# Patient Record
Sex: MALE | Race: WHITE | NOT HISPANIC OR LATINO | Employment: FULL TIME | URBAN - NONMETROPOLITAN AREA
[De-identification: names, ages, dates, MRNs, and addresses within clinical notes are randomized per-mention and may not be internally consistent; named-entity substitution may affect disease eponyms.]

---

## 2022-09-06 ENCOUNTER — TRANSCRIBE ORDERS (OUTPATIENT)
Dept: ADMINISTRATIVE | Facility: HOSPITAL | Age: 47
End: 2022-09-06

## 2022-09-06 DIAGNOSIS — R93.89 ABNORMAL FINDINGS ON IMAGING TEST: Primary | ICD-10-CM

## 2022-09-13 ENCOUNTER — HOSPITAL ENCOUNTER (OUTPATIENT)
Dept: CT IMAGING | Facility: HOSPITAL | Age: 47
Discharge: HOME | End: 2022-09-13
Admitting: INTERNAL MEDICINE

## 2022-09-13 DIAGNOSIS — R93.89 ABNORMAL FINDINGS ON IMAGING TEST: ICD-10-CM

## 2022-09-13 PROCEDURE — 25010000002 IOPAMIDOL 61 % SOLUTION: Performed by: INTERNAL MEDICINE

## 2022-09-13 PROCEDURE — 71260 CT THORAX DX C+: CPT

## 2022-09-13 RX ADMIN — IOPAMIDOL 100 ML: 612 INJECTION, SOLUTION INTRAVENOUS at 16:33

## 2022-09-20 ENCOUNTER — OFFICE VISIT (OUTPATIENT)
Dept: PULMONOLOGY | Facility: CLINIC | Age: 47
End: 2022-09-20

## 2022-09-20 VITALS
DIASTOLIC BLOOD PRESSURE: 98 MMHG | WEIGHT: 189 LBS | HEIGHT: 74 IN | BODY MASS INDEX: 24.26 KG/M2 | OXYGEN SATURATION: 98 % | HEART RATE: 60 BPM | SYSTOLIC BLOOD PRESSURE: 162 MMHG

## 2022-09-20 DIAGNOSIS — R91.1 NODULE OF UPPER LOBE OF LEFT LUNG: Primary | ICD-10-CM

## 2022-09-20 DIAGNOSIS — J45.20 MILD INTERMITTENT ASTHMA WITHOUT COMPLICATION: ICD-10-CM

## 2022-09-20 PROCEDURE — 99204 OFFICE O/P NEW MOD 45 MIN: CPT | Performed by: INTERNAL MEDICINE

## 2022-09-20 RX ORDER — FLUTICASONE PROPIONATE 50 MCG
SPRAY, SUSPENSION (ML) NASAL
COMMUNITY
Start: 2015-09-12

## 2022-09-20 RX ORDER — ALBUTEROL SULFATE 90 UG/1
AEROSOL, METERED RESPIRATORY (INHALATION)
COMMUNITY
Start: 2022-08-23

## 2022-09-20 RX ORDER — FAMOTIDINE 20 MG/1
20 TABLET, FILM COATED ORAL
COMMUNITY
Start: 2022-09-01 | End: 2022-12-20

## 2022-09-20 NOTE — PROGRESS NOTES
"Background:  Pt w sheezing, nonsmoker, 2.1 cm and 9mm nodules SHAUN mild hilar adenopathy 9/13/22   Chief Complaint  Lung Nodule    Subjective    History of Present Illness       Luis Milan presents to [facility].  History of Present Illness  Dr. Wooten has asked me to see him.  Nonsmoking patient who had incidentally found nodule in left lung and another smaller indeterminate nodule in same lobe on ct earlier this month.   He does not smoke.  He has had asthma and uses an inhaler, but no other lung disease.  No history of tuberculosis.  He works in the [employer] and has had histoplasma exposure.  He had Covid exposure but never had Covid.     has a past medical history of Asthma, extrinsic (Childhood).   has a past surgical history that includes Vasectomy.  family history includes Asthma in his father; Cancer in his father; Diabetes in his mother; Hypertension in his mother.   reports that he has never smoked. He does not have any smokeless tobacco history on file. He reports previous alcohol use. He reports that he does not use drugs.  No Known Allergies  Current Outpatient Medications   Medication Instructions   • albuterol sulfate  (90 Base) MCG/ACT inhaler INHALE 2 PUFFS BY MOUTH EVERY 4 HOURS   • famotidine (PEPCID) 20 mg, Oral   • fluticasone (FLONASE) 50 MCG/ACT nasal spray No dose, route, or frequency recorded.      Objective     Vital Signs:   /98   Pulse 60   Ht 188 cm (74\")   Wt 85.7 kg (189 lb)   SpO2 98% Comment: RA  BMI 24.27 kg/m²   Physical Exam  Constitutional:       Appearance: Normal appearance. He is not ill-appearing or diaphoretic.   Eyes:      Extraocular Movements: Extraocular movements intact.   Pulmonary:      Effort: Pulmonary effort is normal. No respiratory distress.      Breath sounds: No wheezing, rhonchi or rales.   Skin:     Findings: No erythema or rash.   Neurological:      Mental Status: He is alert.        Result " Review  Data Reviewed:{ Labs  Result Review  Imaging  Med Tab  Media :23}     XR Chest 2 View    Result Date: 2022  Impression:                             Department of Radiology                         RADIOLOGICAL CONSULTATION REPORT ================================================================================   Pt Name: TODD MARTIN                          MPI: [id number]            : 1975   Pt. Room #: [facility]                        Visit #: [id number]       Pt Status: O   Exam: [id number]                          Completed: 2022 12:28 PM   Ordering MD: TIA JAMES                        MRN: [id number] EXAMINATION: CHEST 2 VIEWS CLINICAL HISTORY: Other specified symptoms and signs involving the circulatory and respiratory systems COMPARISON: None Available. FINDINGS: Cardiac silhouette is within limits.  Both lungs are well expanded.   Faint nodular density, left midlung.  No dense parenchymal consolidation.  No pleural effusion or pneumothorax. IMPRESSION: Faint nodular opacity in the left midlung.  Potentially artifactual.   Recommend CT of the chest to definitively characterize and exclude an underlying nodule.           Dictated By: JUAN BRIZUELA MD,           2022 05:09 PM    CT Chest With Contrast Diagnostic    Result Date: 2022  Impression: 1. Peripheral pleural-based nodule in the left upper lobe laterally measuring 2.1 cm is nonspecific. A neoplasm is considered. Inflammation and infection felt to be less likely. A Stewart hump is also considered but felt to be less likely as there is no CT evidence of pulmonary embolus on the current study. This is not a CTA study. 2. There is also a 9 mm slightly ill-defined left upper lobe pulmonary nodule worrisome for neoplasm. Infectious or inflammatory nodule also considered. 3. Mild left hilar lymphadenopathy. Small mediastinal lymph nodes. 4. Small probable cyst in the right hepatic lobe of the  liver. Probable mild fatty liver. 5. Gastric wall thickening likely due to underdistention. Gastritis and other etiologies are also considered.    This report was finalized on 09/13/2022 17:13 by Dr. Tu Valdes MD.      My interpretation of radiograph: irregular pleural based density suggesting scar.  Additional nodule in SHAUN, indeterminate              Assessment and Plan {CC Problem List  Visit Diagnosis  ROS  Review (Popup)  Health Maintenance  Quality  BestPractice  Medications  SmartSets  SnapShot Encounters  Media :23}   Diagnoses and all orders for this visit:    1. Nodule of upper lobe of left lung (Primary)  -     NM PET/CT Skull Base to Mid Thigh; Future    2. Mild intermittent asthma without complication    regarding the nodule, pt is a nonsmoker with no obvious risk factors for cancer.  I reviewed the ct images with him.  Will plan PET scan.  Discussed rationale.  If abnormal then resection vs navigational bronchoscopy to be considered.  If negative, then we could follow with serial scans. Regarding asthma, this appears controlled; recommend no changes in regimen at this time.  Follow Up {Instructions Charge Capture  Follow-up Communications :23}   Return in about 3 months (around 12/20/2022).  Patient was given instructions and counseling regarding his condition or for health maintenance advice. Please see specific information pulled into the AVS if appropriate.    Electronically signed by Mao Saucedo MD, 9/20/2022, 14:02 CDT

## 2022-09-28 ENCOUNTER — HOSPITAL ENCOUNTER (OUTPATIENT)
Dept: CT IMAGING | Facility: HOSPITAL | Age: 47
Discharge: HOME | End: 2022-09-28

## 2022-09-28 DIAGNOSIS — R91.1 NODULE OF UPPER LOBE OF LEFT LUNG: ICD-10-CM

## 2022-09-28 DIAGNOSIS — R91.1 NODULE OF UPPER LOBE OF LEFT LUNG: Primary | ICD-10-CM

## 2022-09-28 PROCEDURE — 78815 PET IMAGE W/CT SKULL-THIGH: CPT

## 2022-09-28 PROCEDURE — A9552 F18 FDG: HCPCS | Performed by: INTERNAL MEDICINE

## 2022-09-28 PROCEDURE — 0 FLUDEOXYGLUCOSE F18 SOLUTION: Performed by: INTERNAL MEDICINE

## 2022-09-28 RX ADMIN — FLUDEOXYGLUCOSE F18 1 DOSE: 300 INJECTION INTRAVENOUS at 08:38

## 2022-09-28 NOTE — PROGRESS NOTES
Please call the patient regarding his abnormal result.  This showed abnormal uptake in the nodule near the edge of the lung and a couple of lymph nodes on the left side.  This means something is going on in those areas; I am still concerned it could be either histoplasma or tumor.  Before committing to anything invasive, have him do a histo urinary antigen. If that is positive then I would feel better about holding off anything invasive and see if it resolves or calcifies.  Otherwise I think the next step would be to remove that one on the left just under the outer lung lining.  I will order the test; he will have to do that through the [facility] lab

## 2022-09-29 ENCOUNTER — LAB (OUTPATIENT)
Dept: LAB | Facility: HOSPITAL | Age: 47
End: 2022-09-29

## 2022-09-29 DIAGNOSIS — R91.1 NODULE OF UPPER LOBE OF LEFT LUNG: ICD-10-CM

## 2022-09-29 PROCEDURE — 87385 HISTOPLASMA CAPSUL AG IA: CPT

## 2022-10-04 LAB — H CAPSUL AG UR QL IA: <0.5

## 2022-10-05 NOTE — PROGRESS NOTES
Please call the patient regarding his abnormal result.  The urine test came back negative so that did not give us proof of histo infection.  I am still concerned that we should remove the spot surgically.  The only other potential option short of surgery is the nodify blood test for cancer antibiodies, but I'm not sure that that would alter the recommendation in the long run and it would drag the workup along longer.

## 2022-10-06 ENCOUNTER — TELEPHONE (OUTPATIENT)
Dept: PULMONOLOGY | Facility: CLINIC | Age: 47
End: 2022-10-06

## 2022-10-06 NOTE — TELEPHONE ENCOUNTER
----- Message from Mao Saucedo MD sent at 10/5/2022  5:22 PM CDT -----  Please call the patient regarding his abnormal result.  The urine test came back negative so that did not give us proof of histo infection.  I am still concerned that we should remove the spot surgically.  The only other potential option short of surgery is the nodify blood test for cancer antibiodies, but I'm not sure that that would alter the recommendation in the long run and it would drag the workup along longer.

## 2022-10-06 NOTE — TELEPHONE ENCOUNTER
Called him regarding the urine results.    He does want to do the surgery but he has several questions.      Please call him at [phone] and he said you can leave a message.

## 2022-10-07 DIAGNOSIS — R91.1 NODULE OF UPPER LOBE OF LEFT LUNG: Primary | ICD-10-CM

## 2022-10-19 ENCOUNTER — OFFICE VISIT (OUTPATIENT)
Dept: CARDIAC SURGERY | Facility: CLINIC | Age: 47
End: 2022-10-19

## 2022-10-19 VITALS
SYSTOLIC BLOOD PRESSURE: 134 MMHG | HEART RATE: 61 BPM | OXYGEN SATURATION: 99 % | BODY MASS INDEX: 25.05 KG/M2 | WEIGHT: 195.2 LBS | DIASTOLIC BLOOD PRESSURE: 94 MMHG | HEIGHT: 74 IN

## 2022-10-19 DIAGNOSIS — R91.1 LUNG NODULE: Primary | ICD-10-CM

## 2022-10-19 PROCEDURE — 99204 OFFICE O/P NEW MOD 45 MIN: CPT | Performed by: THORACIC SURGERY (CARDIOTHORACIC VASCULAR SURGERY)

## 2022-10-20 ENCOUNTER — TELEPHONE (OUTPATIENT)
Dept: CARDIAC SURGERY | Facility: CLINIC | Age: 47
End: 2022-10-20

## 2022-10-20 NOTE — TELEPHONE ENCOUNTER
Patient is scheduled for follow up on 12/21/22 - CT scan is typically scheduled same day prior to office visit. I attempted to call patient, no answer. LM with the above information explaining we would call closer to appt date with the CT information.

## 2022-10-20 NOTE — TELEPHONE ENCOUNTER
Caller: Luis Milan    Relationship: Self    Best call back number: [phone]    What is the best time to reach you: ANYTIME, VM OKAY TO LEAVE    Who are you requesting to speak with (clinical staff, provider,  specific staff member): CLINICAL    Do you know the name of the person who called: LUIS    What was the call regarding: PT WANTS TO KNOW WHAT DATE DR. MIX WOULD LIKE FOR HIM TO COMPLETE THE CT SCAN WITH CONTRAST.     Do you require a callback: YES

## 2022-10-27 ENCOUNTER — PATIENT ROUNDING (BHMG ONLY) (OUTPATIENT)
Dept: CARDIAC SURGERY | Facility: CLINIC | Age: 47
End: 2022-10-27

## 2022-10-27 NOTE — PROGRESS NOTES
A PEMRED message has been sent to the patient for PATIENT ROUNDING with [facility].

## 2022-11-14 NOTE — PROGRESS NOTES
Chief Complaint   Patient presents with   • Lung Nodule     New patient referred by Dr. Saucedo         Subjective     History of Present Illness    47-year-old male of Dr. Wooten and Dr. Saucedo who presents today for the finding of an incidental left lung nodule and a smaller indeterminate nodule in the same lobe.  He is a non-smoker but does have passive exposure to smoke.  He does have asthma and uses inhaler.  He denies tuberculosis.  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  He has had COVID exposure but never had COVID.  He denies unintended weight loss, hoarseness of voice, chest pain, hemoptysis, history of pneumonia, or cough.  He has had further testing and I have been asked to evaluate for consideration of wedge resection.      Review of Systems   Constitutional: Positive for fatigue. Negative for activity change, appetite change, chills, diaphoresis, fever and unexpected weight change.   HENT: Negative for dental problem, hearing loss, nosebleeds, sore throat, trouble swallowing and voice change.    Eyes: Negative for photophobia, redness and visual disturbance.   Respiratory: Negative for apnea, cough, chest tightness, shortness of breath, wheezing and stridor.    Cardiovascular: Negative for chest pain, palpitations and leg swelling.   Gastrointestinal: Negative for abdominal distention, abdominal pain, blood in stool, constipation, diarrhea, nausea and vomiting.   Endocrine: Negative for cold intolerance, heat intolerance, polyphagia and polyuria.   Genitourinary: Negative for decreased urine volume, difficulty urinating, dysuria, flank pain, frequency, hematuria and urgency.   Musculoskeletal: Negative for arthralgias, back pain, gait problem, joint swelling, myalgias and neck pain.   Skin: Negative for pallor, rash and wound.   Allergic/Immunologic: Negative for immunocompromised state.   Neurological: Negative for dizziness, tremors, seizures, syncope, speech difficulty,  "weakness, light-headedness, numbness and headaches.   Hematological: Does not bruise/bleed easily.   Psychiatric/Behavioral: Negative for confusion, sleep disturbance and suicidal ideas. The patient is not nervous/anxious.           Past Medical History:   Diagnosis Date   • Asthma, extrinsic Childhood     Past Surgical History:   Procedure Laterality Date   • VASECTOMY       Family History   Problem Relation Age of Onset   • Diabetes Mother    • Hypertension Mother    • Asthma Father    • Cancer Father      Social History     Tobacco Use   • Smoking status: Never     Passive exposure: Never   • Smokeless tobacco: Former     Types: Chew     Quit date: 2020   Substance Use Topics   • Alcohol use: Not Currently     Comment: Every once in a while. Nothing weekly   • Drug use: Never     Current Outpatient Medications   Medication Sig Dispense Refill   • albuterol sulfate  (90 Base) MCG/ACT inhaler INHALE 2 PUFFS BY MOUTH EVERY 4 HOURS     • famotidine (PEPCID) 20 MG tablet Take 20 mg by mouth.     • fluticasone (FLONASE) 50 MCG/ACT nasal spray        No current facility-administered medications for this visit.     Allergies:  Patient has no known allergies.    Objective      Vital Signs  Visit Vitals  /94 (BP Location: Right arm, Patient Position: Sitting, Cuff Size: Adult)   Pulse 61   Ht 188 cm (74\")   Wt 88.5 kg (195 lb 3.2 oz)   SpO2 99%   BMI 25.06 kg/m²         Physical Exam  Constitutional:       Appearance: He is well-developed.   HENT:      Head: Normocephalic and atraumatic.   Eyes:      Pupils: Pupils are equal, round, and reactive to light.   Neck:      Thyroid: No thyromegaly.      Vascular: No JVD.      Trachea: No tracheal deviation.   Cardiovascular:      Rate and Rhythm: Normal rate and regular rhythm.      Heart sounds: Normal heart sounds. No murmur heard.    No friction rub. No gallop.   Pulmonary:      Effort: Pulmonary effort is normal. No respiratory distress.      Breath sounds: " Normal breath sounds. No wheezing or rales.   Chest:      Chest wall: No tenderness.   Abdominal:      General: There is no distension.      Palpations: Abdomen is soft.      Tenderness: There is no abdominal tenderness.   Musculoskeletal:         General: Normal range of motion.      Cervical back: Normal range of motion and neck supple.   Lymphadenopathy:      Cervical: No cervical adenopathy.   Skin:     General: Skin is warm and dry.   Neurological:      Mental Status: He is alert and oriented to person, place, and time.      Cranial Nerves: No cranial nerve deficit.         Results Review:     Study Result    Narrative & Impression   EXAMINATION:  CT CHEST W CONTRAST DIAGNOSTIC-  9/13/2022 4:25 PM CDT     HISTORY: Pulmonary nodule. R93.89-Abnormal findings on diagnostic  imaging of other specified body structures.     COMPARISON : No comparison study.     DLP: 210 mGy-cm. Automated dosage reduction technique was utilized to  reduce patient dosage.     TECHNIQUE: CT was performed of the chest with IV contrast. Sagittal and  coronal images were reconstructed.     MEDIASTINUM, HEART AND VASCULAR STRUCTURES: The thoracic aorta and  pulmonary arteries are normal caliber. There is a 9 mm short axis  diameter lymph node to the left of the aortic arch. There are small AP  window lymph nodes. There is a 1 cm proximal left hilar short axis  diameter lymph node. There is another 1.1 cm short axis diameter  proximal left hilar lymph node. There are couple of smaller lymph nodes  also in the left hilum.     LUNGS: There is a 2.1 cm pleural-based area of nodularity in the left  upper lobe laterally image 78 series 3. There is a 9 mm slightly  ill-defined left upper lobe pulmonary nodule image 84 series 3.     UPPER ABDOMEN: There is a probable 7 mm cyst in the right lobe of the  liver image 166 series 2. There may be mild fatty liver. There are no  dense gallstones. There is thickening of the gastric wall. There  is  underdistention of the stomach. The pancreas, spleen, adrenal glands and  visualized portions of the kidneys demonstrate no abnormality.     BONES: There is pectus excavatum deformity of the sternum. No acute  appearing bony abnormality is seen.     IMPRESSION:  1. Peripheral pleural-based nodule in the left upper lobe laterally  measuring 2.1 cm is nonspecific. A neoplasm is considered. Inflammation  and infection felt to be less likely. A Stewart hump is also considered  but felt to be less likely as there is no CT evidence of pulmonary  embolus on the current study. This is not a CTA study.  2. There is also a 9 mm slightly ill-defined left upper lobe pulmonary  nodule worrisome for neoplasm. Infectious or inflammatory nodule also  considered.  3. Mild left hilar lymphadenopathy. Small mediastinal lymph nodes.  4. Small probable cyst in the right hepatic lobe of the liver. Probable  mild fatty liver.  5. Gastric wall thickening likely due to underdistention. Gastritis and  other etiologies are also considered.          This report was finalized on 09/13/2022 17:13 by Dr. Tu Valdes MD.     Study Result    Narrative & Impression   Exam: NM PET/CT SKULL BASE TO MID THIGH-     Indication: Evaluation of solitary pulmonary nodule     Comparison: Chest CT 9/13/2022     13.0 mCi F-18 FDG was administered IV per protocol via the LEFT  antecubital fossa. Blood glucose at the time of injection was 98 mg/dl.     PET/CT images were obtained from the base of the skull to the proximal  femurs approximately 60 minutes after radiotracer administration.  Noncontrast CT images of the neck, chest, abdomen, and pelvis were  obtained for attenuation correction and anatomic localization. DLP: 1063  mGy cm. Automated exposure control was also utilized to decrease patient  radiation dose.     Findings:  Background right hepatic lobe metabolic activity measures max SUV 2.8.     *  Redemonstrated 1.5 cm LEFT upper lobe  pleural/subpleural nodule with  hypermetabolic activity, maximum SUV 4.0.     *  8 mm LEFT upper lobe pulmonary nodule on image 126 has only low level  FDG uptake below physiologic background with a maximum SUV of 1.6.     *  Hypermetabolic LEFT hilar and LEFT mediastinal lymph nodes are  present. LEFT hilar lymph node on fused image 129 has a maximum SUV of  3.5. 2.0 x 0.9 cm LEFT mediastinal lymph node adjacent to the main  pulmonary artery on image 129 has a maximum SUV of 3.4. No RIGHT hilar  or RIGHT mediastinal hypermetabolic lymph nodes.     *  No abnormal hypermetabolic activity in the neck.     *  No abnormal hypermetabolic activity in the abdomen or pelvis.     Non-FDG avid findings:     Lower intracranial cavity appears unremarkable. No acute orbital  finding. Parapharyngeal fat planes are maintained. Parotid glands appear  unremarkable. No large thyroid nodule. Mastoid air cells are clear.  Trace mucosal thickening in the RIGHT maxillary sinus. Paranasal sinuses  are otherwise clear.     The central airways are clear. No consolidation or pleural effusion. No  enlarged axillary or supraclavicular lymph nodes. Thoracic aorta is  nonaneurysmal. No pericardial effusion. Pectus excavatum deformity. No  acute chest wall soft tissue abnormality.     Small low-density liver lesions too small to characterize but likely a  cyst. No gallstones or biliary ductal dilatation. Pancreas, spleen, and  adrenal glands are unremarkable. No urolithiasis or hydronephrosis. No  focal urinary bladder abnormality. Normal appendix. No abnormal bowel  distention or evidence of active bowel inflammation.     No ascites or free pelvic fluid. No pelvic mass or pelvic collection.  Prostate and seminal vesicles are unremarkable. Nonaneurysmal abdominal  aorta. No enlarged retroperitoneal, mesenteric, pelvic, or inguinal  lymph nodes. Small umbilical fat-containing hernia. No acute osseous  finding.     IMPRESSION:     1.  1.5 cm LEFT  upper lobe pleural/subpleural nodule with hypermetabolic  activity. This is concerning for a neoplastic process although  differential diagnosis also includes an infectious or inflammatory  process. Hypermetabolic LEFT hilar and LEFT mediastinal lymph nodes are  presumably related to this process. Recommend close clinical follow-up.     2.  8 mm LEFT upper lobe pulmonary nodule with low-level FDG uptake  below physiologic background. This may be related to an infectious or  inflammatory process but an indolent hypometabolic neoplasm is also  within the differential. Recommend continued imaging surveillance.     3.  No evidence of metastatic disease in the neck, abdomen, or pelvis.  This report was finalized on 09/28/2022 10:39 by Dr. Celestino Shore MD.            I reviewed the patient's new clinical results.  Discussed with patient and wife      Assessment & Plan       Diagnoses and all orders for this visit:    1. Lung nodule (Primary)  -     CT chest w contrast; Future          I discussed the patients findings and my recommendations with patient and wife.    We discussed the differential diagnoses possible given a lung nodule and a secondary nodule.  The appearance of the nodules to suggest more likely infectious rather true malignancy at this time on my review.  He is asymptomatic.  He is a lifelong non-smoker unfortunately I suspect to clear his disease, a lobectomy required.    We discussed options for care including continued surveillance verses efforts towards diagnosis and treatment.  We discussed options for diagnosis including bronchoscopy, CT-guided needle biopsy, or surgical biopsy with either cervical mediastinoscopy or Thoracoscopy with resection.  Surveillance is probably the best option at this time.  Based Fleischner criteria, it is recommended 3 months follow with CT scan of the chest.    Signs and symptoms suggesting malignancy including but not limited to hoarseness of voice, hemoptysis,  persistent cough, unintended weight loss, chest pain, or persistent pneumonia.  The potential of a delayed diagnosis and adverse results were discussed.  Collaboratively a decision has been made that reflects the patient's risk factors, the data available and patient's considerations.  All questions have been answered to the best of my ability and he is agreeable to the aforementioned plan.  I will recheck Dr. Saucedo as to the merits of tumor markers/malignancy risk analysis/cancer blood test.      Alternatively we can proceed with mediastinoscopy and if this is negative then proceed with resection with the hope towards a wedge resection but be prepared for a lobectomy.    All questions been answered to the best of my ability and is agreeable to the forementioned plan.    He is a non-smoker.    Many thanks for the opportunity to care for your patient.    I will continue to keep you apprised of provided care as it ensues.

## 2022-12-02 ENCOUNTER — CLINICAL SUPPORT (OUTPATIENT)
Dept: PULMONOLOGY | Facility: CLINIC | Age: 47
End: 2022-12-02

## 2022-12-02 DIAGNOSIS — R91.1 NODULE OF UPPER LOBE OF LEFT LUNG: Primary | ICD-10-CM

## 2022-12-02 NOTE — PROGRESS NOTES
Patient came in today to get the Nodify blood test done for Dr. Saucedo.   This is a test that is billed through his insurance by [employer].  We didn't use any of our blood draw supplies as they were provided by [employer].       Edwige Abad CMA 12/02/2022

## 2022-12-19 NOTE — PROGRESS NOTES
"Chief Complaint  Nodule of upper lobe of left lung    Subjective    History of Present Illness {CC  Problem List  Visit Diagnosis   Encounters  Notes  Medications  Labs  Result Review Imaging  Media     Luis Milan presents to [facility] for:    History of Present Illness  Mr. Milan is here for follow up and management of left lung nodule. He has repeat imaging scheduled for tomorrow with subsequent appointment with CT surgery. Nodify lab testing has been received back and is less than 1% likelihood of malignancy. He denies any pulmonary symptoms. He has had asthma his whole life and feels like it is well controlled with albuterol as needed. He has never smoked.        Prior to Admission medications    Medication Sig Start Date End Date Taking? Authorizing Provider   albuterol sulfate  (90 Base) MCG/ACT inhaler INHALE 2 PUFFS BY MOUTH EVERY 4 HOURS 8/23/22   ProviderJohnnie MD   famotidine (PEPCID) 20 MG tablet Take 20 mg by mouth. 9/1/22 9/2/23  Johnnie Beauchamp MD   fluticasone (FLONASE) 50 MCG/ACT nasal spray  9/12/15   ProviderJohnnie MD       Social History     Socioeconomic History   • Marital status:    Tobacco Use   • Smoking status: Never     Passive exposure: Never   • Smokeless tobacco: Former     Types: Chew     Quit date: 2020   Substance and Sexual Activity   • Alcohol use: Not Currently     Comment: Every once in a while. Nothing weekly   • Drug use: Never   • Sexual activity: Yes     Partners: Female     Birth control/protection: Vasectomy     Comment: Been  over 20 years 1 partner       Objective   Vital Signs:   /74   Pulse 80   Ht 188 cm (74\")   Wt 92.3 kg (203 lb 6.4 oz)   SpO2 98%   BMI 26.12 kg/m²     Physical Exam  Constitutional:       General: He is not in acute distress.     Interventions: Face mask in place.   HENT:      Head: Normocephalic.      Nose: Nose normal.      " Mouth/Throat:      Mouth: Mucous membranes are moist.   Eyes:      General: No scleral icterus.  Cardiovascular:      Rate and Rhythm: Normal rate.   Pulmonary:      Effort: No respiratory distress.   Abdominal:      General: There is no distension.   Neurological:      Mental Status: He is alert and oriented to person, place, and time.   Psychiatric:         Mood and Affect: Mood normal.         Behavior: Behavior is cooperative.        Result Review :{ Labs  Result Review  Imaging  Med Tab  Media :          No results found for this or any previous visit.                   Assessment and Plan {CC Problem List  Visit Diagnosis  ROS  Review (Popup)  Health Maintenance  Quality  BestPractice  Medications  SmartSets  SnapShot Encounters  Media      Diagnoses and all orders for this visit:    1. Nodule of upper lobe of left lung (Primary)    2. Mild intermittent asthma without complication        BMI is >= 25 and <30. (Overweight) The following options were offered after discussion;: weight loss educational material (shared in after visit summary)      Continue albuterol as needed. CT chest scheduled for tomorrow with office appt at [facility] to follow. We reviewed Nodify lab results with less than 1% likelihood of malignancy. Follow up in a few months with spirometry and dlco. Call in the interim with issues.     Olamide Patel, APRN  12/20/2022  16:05 CST    Follow Up {Instructions Charge Capture  Follow-up Communications   Return in about 3 months (around 3/20/2023) for spirometry and dlco .    Patient was given instructions and counseling regarding his condition or for health maintenance advice. Please see specific information pulled into the AVS if appropriate.

## 2022-12-20 ENCOUNTER — OFFICE VISIT (OUTPATIENT)
Dept: PULMONOLOGY | Facility: CLINIC | Age: 47
End: 2022-12-20

## 2022-12-20 VITALS
BODY MASS INDEX: 26.1 KG/M2 | HEIGHT: 74 IN | WEIGHT: 203.4 LBS | SYSTOLIC BLOOD PRESSURE: 132 MMHG | DIASTOLIC BLOOD PRESSURE: 74 MMHG | HEART RATE: 80 BPM | OXYGEN SATURATION: 98 %

## 2022-12-20 DIAGNOSIS — J45.20 MILD INTERMITTENT ASTHMA WITHOUT COMPLICATION: Chronic | ICD-10-CM

## 2022-12-20 DIAGNOSIS — R91.1 NODULE OF UPPER LOBE OF LEFT LUNG: Primary | ICD-10-CM

## 2022-12-20 PROCEDURE — 99213 OFFICE O/P EST LOW 20 MIN: CPT | Performed by: NURSE PRACTITIONER

## 2022-12-21 ENCOUNTER — HOSPITAL ENCOUNTER (OUTPATIENT)
Dept: CT IMAGING | Facility: HOSPITAL | Age: 47
Discharge: HOME | End: 2022-12-21
Admitting: THORACIC SURGERY (CARDIOTHORACIC VASCULAR SURGERY)

## 2022-12-21 ENCOUNTER — OFFICE VISIT (OUTPATIENT)
Dept: CARDIAC SURGERY | Facility: CLINIC | Age: 47
End: 2022-12-21
Payer: COMMERCIAL

## 2022-12-21 VITALS
DIASTOLIC BLOOD PRESSURE: 92 MMHG | HEIGHT: 74 IN | BODY MASS INDEX: 26.1 KG/M2 | WEIGHT: 203.4 LBS | OXYGEN SATURATION: 98 % | HEART RATE: 70 BPM | SYSTOLIC BLOOD PRESSURE: 148 MMHG

## 2022-12-21 DIAGNOSIS — R91.1 LUNG NODULE: Primary | ICD-10-CM

## 2022-12-21 DIAGNOSIS — R91.1 LUNG NODULE: ICD-10-CM

## 2022-12-21 LAB — CREAT BLDA-MCNC: 1 MG/DL (ref 0.6–1.3)

## 2022-12-21 PROCEDURE — 82565 ASSAY OF CREATININE: CPT

## 2022-12-21 PROCEDURE — 99213 OFFICE O/P EST LOW 20 MIN: CPT | Performed by: THORACIC SURGERY (CARDIOTHORACIC VASCULAR SURGERY)

## 2022-12-21 PROCEDURE — 25010000002 IOPAMIDOL 61 % SOLUTION: Performed by: THORACIC SURGERY (CARDIOTHORACIC VASCULAR SURGERY)

## 2022-12-21 PROCEDURE — 71260 CT THORAX DX C+: CPT

## 2022-12-21 RX ADMIN — IOPAMIDOL 84 ML: 612 INJECTION, SOLUTION INTRAVENOUS at 12:54

## 2023-01-09 PROBLEM — R91.1 LUNG NODULE: Status: ACTIVE | Noted: 2023-01-09

## 2023-01-09 NOTE — PROGRESS NOTES
Subjective   Patient ID: Luis Milan is a 47 y.o. male who is here for follow-up of a known lung nodules.   Chief Complaint   Patient presents with   • Lung Nodule     Patient is here for follow up w/CT      History of Present Illness    No new events. No new medical events. Worried about testing today.  His blood test for cancer was negative.     He denies unintended weight loss, hoarseness of voice, chest pain, hemoptysis, history of pneumonia, or cough.    The following portions of the patient's history were reviewed and updated as appropriate: allergies, current medications, past family history, past medical history, past social history, past surgical history and problem list.       Objective   Physical Exam  Constitutional:       Appearance: He is well-developed.   HENT:      Head: Normocephalic and atraumatic.   Eyes:      Pupils: Pupils are equal, round, and reactive to light.   Neck:      Thyroid: No thyromegaly.      Vascular: No JVD.      Trachea: No tracheal deviation.   Cardiovascular:      Rate and Rhythm: Normal rate and regular rhythm.      Heart sounds: Normal heart sounds. No murmur heard.    No friction rub. No gallop.   Pulmonary:      Effort: Pulmonary effort is normal. No respiratory distress.      Breath sounds: Normal breath sounds. No wheezing or rales.   Chest:      Chest wall: No tenderness.   Abdominal:      General: There is no distension.      Palpations: Abdomen is soft.      Tenderness: There is no abdominal tenderness.   Musculoskeletal:         General: Normal range of motion.      Cervical back: Normal range of motion and neck supple.   Lymphadenopathy:      Cervical: No cervical adenopathy.   Skin:     General: Skin is warm and dry.   Neurological:      Mental Status: He is alert and oriented to person, place, and time.      Cranial Nerves: No cranial nerve deficit.           CT Chest With Contrast Diagnostic    Result Date: 12/21/2022  Narrative: CT CHEST W CONTRAST DIAGNOSTIC-  12/21/2022 12:52 PM CST  HISTORY: lung nodule; R91.1-Solitary pulmonary nodule  COMPARISON: CT scan dated 09/13/2022  DOSE LENGTH PRODUCT: 277 mGy cm. Automated exposure control was also utilized to decrease patient radiation dose.  TECHNIQUE: Serial helical tomographic images of the chest were acquired following the intravenous infusion of contrast. Multiplanar reformatted images were provided for review.  FINDINGS:  Visualized neck base: The imaged portion of the base of the neck appears unremarkable.  Lungs: Previously identified left upper lobe juxtapleural nodule is much smaller in size, now only 1 cm x 0.5 cm in axial diameters (series 4-image 95), previously 2.0 x 1.8 cm. The second left upper lobe nodule is also smaller, previously 1.1 cm in diameter and now measuring 0.7 cm. There are no new or enlarging nodules. No consolidation. Lungs are well expanded. The airways are clear.  Heart: The heart is normal in size. There is no pericardial effusion.  Vasculature: Thoracic aorta is normal in caliber. No dissection identified. The pulmonary arteries are normal in appearance.  Mediastinum and lymph nodes: No suspicious hilar or mediastinal adenopathy..  Skeletal and soft tissues: Chest wall soft tissues are unremarkable. No acute bony abnormality.  Upper abdomen: The imaged portion of the upper abdomen demonstrates no acute process.       Impression: 1. Both of the left upper lobe pulmonary nodules are smaller in size. The larger 2.0 x 1.8 cm juxtapleural nodule now measures 1.0 x 0.5 cm. The second 1.1 cm nodule has decreased to 0.7 cm. Favor infectious/inflammatory process over lung neoplasia. 2. There are no new or enlarging nodules. 3. No suspicious adenopathy.   This report was finalized on 12/21/2022 13:59 by Dr Alok Sigala, .      Diagnoses and all orders for this visit:    1. Lung nodule (Primary)  -     CT chest w contrast; Future          Assessment & Plan   Independent Review of Radiographic  Studies:    The left upper lobe lung nodule that is dominant in size has had marked reduction measuring now in greatest dimension 1 cm compared to previously 2 cm.  The second lesion measuring 1.1 cm size  now measures 0.6 cm.  No new lung nodules.  No adenopathy.    Given reduction in size I favor an infectious/inflammatory process.  Recommend continued ongoing surveillance to ensure resolution.  He has been congratulated as to non-smoking status.  Discussed signs and symptoms which would suggest an earlier study.  All question answered the best my ability and he is agreeable to the forementioned plan.    Many thanks for the opportunity to care for your patient.    I will continue to keep you apprised of provided care as it ensues.

## 2023-12-20 ENCOUNTER — OFFICE VISIT (OUTPATIENT)
Dept: CARDIAC SURGERY | Facility: CLINIC | Age: 48
End: 2023-12-20
Payer: COMMERCIAL

## 2023-12-20 ENCOUNTER — HOSPITAL ENCOUNTER (OUTPATIENT)
Dept: CT IMAGING | Facility: HOSPITAL | Age: 48
Discharge: HOME | End: 2023-12-20
Payer: COMMERCIAL

## 2023-12-20 VITALS
BODY MASS INDEX: 25.89 KG/M2 | WEIGHT: 201.7 LBS | HEART RATE: 71 BPM | OXYGEN SATURATION: 99 % | HEIGHT: 74 IN | SYSTOLIC BLOOD PRESSURE: 144 MMHG | DIASTOLIC BLOOD PRESSURE: 92 MMHG

## 2023-12-20 DIAGNOSIS — R91.1 LUNG NODULE: ICD-10-CM

## 2023-12-20 DIAGNOSIS — R91.1 LUNG NODULE: Primary | ICD-10-CM

## 2023-12-20 PROCEDURE — 71250 CT THORAX DX C-: CPT

## 2023-12-20 NOTE — PROGRESS NOTES
Subjective   Patient ID: Luis Milan is a 48 y.o. male who is here for follow-up of a known lung nodules.   Chief Complaint   Patient presents with    Lung Nodule     Patient is here for follow up w/CT     History of Present Illness  12/20/2023  Luis Milan is a 48-year-old male who presents today for follow-up of left upper lobe lung nodules.    The patient reports he is doing well.  He denies smoking.  He denies any major industrial chemical exposures.  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  He reports working in [employer].  He states he is exposed to secondhand smoke.  He states he has had CT scans 7 years ago, 5 years ago, 2 years ago, and 1 year ago.    12/21/2022  No new events. No new medical events. Worried about testing today.  His blood test for cancer was negative.     He denies unintended weight loss, hoarseness of voice, chest pain, hemoptysis, history of pneumonia, or cough.    The following portions of the patient's history were reviewed and updated as appropriate: allergies, current medications, past family history, past medical history, past social history, past surgical history and problem list.       Objective   Physical Exam  Constitutional:       Appearance: He is well-developed.   HENT:      Head: Normocephalic and atraumatic.   Eyes:      Pupils: Pupils are equal, round, and reactive to light.   Neck:      Thyroid: No thyromegaly.      Vascular: No JVD.      Trachea: No tracheal deviation.   Cardiovascular:      Rate and Rhythm: Normal rate and regular rhythm.      Heart sounds: Normal heart sounds. No murmur heard.     No friction rub. No gallop.   Pulmonary:      Effort: Pulmonary effort is normal. No respiratory distress.      Breath sounds: Normal breath sounds. No wheezing or rales.   Chest:      Chest wall: No tenderness.   Abdominal:      General: There is no distension.      Palpations: Abdomen is soft.      Tenderness: There is no abdominal tenderness.   Musculoskeletal:          General: Normal range of motion.      Cervical back: Normal range of motion and neck supple.   Lymphadenopathy:      Cervical: No cervical adenopathy.   Skin:     General: Skin is warm and dry.   Neurological:      Mental Status: He is alert and oriented to person, place, and time.      Cranial Nerves: No cranial nerve deficit.           CT Chest Without Contrast Diagnostic    Result Date: 12/20/2023  Narrative: EXAM/TECHNIQUE: CT chest without contrast  INDICATION: lung nodule; R91.1-Solitary pulmonary nodule  COMPARISON: 6/21/2023  DLP: 391.25 mGy.cm. Automated exposure control was also utilized to decrease patient radiation dose.  FINDINGS:  The central airways are clear. No consolidation or pleural effusion. Mild biapical parenchymal scarring. Mild centrilobular emphysema.  No change in 7 mm subpleural left upper lobe pulmonary nodule, image 86. No change in 6 mm left upper lobe pulmonary nodule, image 90. No new or enlarging pulmonary nodule.  No enlarged thoracic lymph nodes. Main pulmonary artery is nondilated. Thoracic aorta is nonaneurysmal. No pericardial effusion.  No large thyroid nodule. No acute chest wall soft tissue abnormality. No acute finding in the included portion of the upper abdomen. No acute osseous finding.      Impression:  No change in 7 and 6 mm left upper lobe pulmonary nodules. Continued imaging surveillance is recommended.  This report was signed and finalized on 12/20/2023 8:25 AM by Dr. Ceelstino Shore MD.       Independent Review of Radiographic Studies:    The left upper lobe lung nodule that is dominant in size has had marked reduction measuring now in greatest dimension 1 cm compared to previously 2 cm.  The second lesion measuring 1.1 cm size  now measures 0.6 cm.  No new lung nodules.  No adenopathy.    12/20/2023: Left upper lobe lung nodules x2 are noncalcified and stable compared to last year's assessment.      Diagnoses and all orders for this visit:    1. Lung nodule  (Primary)  -     CT Chest Without Contrast; Future      Assessment & Plan     Impression:  1. Left upper lobe multiple lung nodules x2 and they are stable, non-smoker.    Medical decision making/Recommendations/Plan:  I believe should he make conservative at this point, continue ongoing active surveillance.  I will plan to see him back in 9 months with a repeat CT scan of the chest without contrast.  Discussed signs and symptoms which prompt an earlier assessment.  He is a non-smoker and congratulated as to that.  Thanks again for the opportunity to aid in the care of your patient.  I will continue to keep you apprised of care as it ensues.    Transcribed from ambient dictation for Oneal Summers MD by Ronny Palmer.  12/20/23   12:36 CST    Patient or patient representative verbalized consent to the visit recording.  I have personally performed the services described in this document as transcribed by the above individual, and it is both accurate and complete.

## 2024-09-25 ENCOUNTER — OFFICE VISIT (OUTPATIENT)
Dept: CARDIAC SURGERY | Facility: CLINIC | Age: 49
End: 2024-09-25
Payer: COMMERCIAL

## 2024-09-25 ENCOUNTER — HOSPITAL ENCOUNTER (OUTPATIENT)
Dept: CT IMAGING | Facility: HOSPITAL | Age: 49
Discharge: HOME | End: 2024-09-25
Admitting: THORACIC SURGERY (CARDIOTHORACIC VASCULAR SURGERY)
Payer: COMMERCIAL

## 2024-09-25 VITALS
HEART RATE: 75 BPM | SYSTOLIC BLOOD PRESSURE: 142 MMHG | OXYGEN SATURATION: 100 % | BODY MASS INDEX: 25.41 KG/M2 | WEIGHT: 198 LBS | DIASTOLIC BLOOD PRESSURE: 84 MMHG | HEIGHT: 74 IN

## 2024-09-25 DIAGNOSIS — R91.1 LUNG NODULE: ICD-10-CM

## 2024-09-25 DIAGNOSIS — R91.8 MULTIPLE LUNG NODULES: Primary | ICD-10-CM

## 2024-09-25 PROCEDURE — 99213 OFFICE O/P EST LOW 20 MIN: CPT | Performed by: THORACIC SURGERY (CARDIOTHORACIC VASCULAR SURGERY)

## 2024-09-25 PROCEDURE — 71250 CT THORAX DX C-: CPT

## 2024-10-04 ENCOUNTER — TELEPHONE (OUTPATIENT)
Dept: CARDIAC SURGERY | Facility: CLINIC | Age: 49
End: 2024-10-04

## 2024-10-04 NOTE — TELEPHONE ENCOUNTER
Caller: uLis Milan    Relationship: Self    Best call back number: [phone]    What orders are you requesting (i.e. lab or imaging): CT SCAN    In what timeframe would the patient need to come in: BEFORE JUNE 2025    Where will you receive your lab/imaging services: [facility]    Additional notes: PT IS NEEDING HIS ORDER SENT TO [facility] DUE TO THE OUT OF POCKET COST AT [facility] BEING $1200. PT STATES HE CAN GET IT DONE AT [facility] FOR $100. THANK YOU

## 2024-10-04 NOTE — TELEPHONE ENCOUNTER
Called and left message with [facility] Scheduling department requesting fax number. If they call back, please obtain fax number and document in this encounter

## 2024-10-13 NOTE — PROGRESS NOTES
"Chief Complaint   Patient presents with    Lung Nodule     Pt is here for follow-up w/ CT         Subjective     History of Present Illness  The patient presents for evaluation of lung nodules.    He has been living with asthma since childhood, which has led to breathing difficulties throughout the year. His condition is particularly exacerbated during spring and fall, necessitating the use of an inhaler. 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 Despite these challenges, he maintains a healthy lifestyle, including regular running and a balanced diet. He reports no instances of hemoptysis or unintentional weight loss.    SOCIAL HISTORY  He is a non-smoker.    Review of Systems           Current Outpatient Medications   Medication Sig Dispense Refill    albuterol sulfate  (90 Base) MCG/ACT inhaler INHALE 2 PUFFS BY MOUTH EVERY 4 HOURS      fluticasone (FLONASE) 50 MCG/ACT nasal spray        No current facility-administered medications for this visit.     Allergies:  Patient has no known allergies.    Objective      Vital Signs  Visit Vitals  /84 (BP Location: Right arm, Patient Position: Sitting, Cuff Size: Adult)   Pulse 75   Ht 188 cm (74\")   Wt 89.8 kg (198 lb)   SpO2 100%   BMI 25.42 kg/m²         Physical Exam  Physical Exam  Patient is in no acute distress, appropriate, alert.  Lungs are clear to auscultation bilaterally without wheezing, rubs, or rales.  Heart has a regular rate and rhythm without murmurs, rubs, or gallops.  Abdomen is soft, nondistended, nontender.  Extremities show no clubbing, cyanosis or edema.    Results Review:   CT Chest Without Contrast Diagnostic    Result Date: 9/25/2024  Narrative: EXAM/TECHNIQUE: CT chest without contrast  INDICATION: Lung nodule; R91.1-Solitary pulmonary nodule  COMPARISON: 12/20/2023  DLP: 455.88 mGy.cm. Automated exposure control was also " utilized to decrease patient radiation dose.  FINDINGS:  Central airways are clear. No consolidation or pleural effusion. No advanced emphysematous or fibrotic change. Mild bilateral central bronchiectasis.  No change in 7 mm subpleural left upper lobe pulmonary nodule on image 89. No change in 6 mm left upper lobe pulmonary nodule on image 92. No new or enlarging pulmonary nodule.  No enlarged thoracic lymph nodes. Main pulmonary artery is nondilated. Thoracic aorta is nonaneurysmal. No pericardial effusion.  No large thyroid nodule. No acute chest soft tissue abnormality. No acute finding in the visualized portion of the upper abdomen. No acute osseous finding. Mild pectus excavatum deformity.      Impression:  No change in 7 mm and 6 mm left upper lobe pulmonary nodules. These have decreased in size when compared to older exams dating back to 9/13/2022. Favor etiology to be an infectious or inflammatory process.  This report was signed and finalized on 9/25/2024 8:24 AM by Dr. Celestino Shroe MD.     Results  Imaging  CT scan of chest shows two left upper lobe lung nodules, one measuring 1 cm and the other 6 mm, with no change in size compared to previous study in December 2023.  I reviewed the patient's new clinical results.  Discussed with patient      Assessment & Plan       Diagnoses and all orders for this visit:    1. Multiple lung nodules (Primary)  -     CT Chest Without Contrast; Future      Assessment & Plan  1. Left upper lobe lung nodules.  CT scan of the chest obtained on 09/25/2024 shows two left upper lobe lung nodules. The previously described 1 cm lesion remains 1 cm, and the 6 mm lesion now measures 6 mm, indicating no change since the previous study in December 2023. Given the stability of these nodules over the past two years, ongoing surveillance is recommended. The patient is an excellent candidate for resection, but due to the stability and non-calcified nature of the nodules, continued  monitoring is preferred. A repeat imaging study will be planned in 9 months. If there is no change at that time, the interval may be extended to a year, and potentially longer if stability persists. The patient understands and agrees with this plan.      He is a non smoker.   Follow-up  Return in 9 months for follow-up.    Transcribed from ambient dictation for Oneal Summers MD by Oneal Summers MD.  10/13/24   15:11 CDT    Patient or patient representative verbalized consent for the use of Ambient Listening during the visit with  Oneal Summers MD for chart documentation. 10/13/2024  15:13 CDT

## 2025-03-20 ENCOUNTER — TELEPHONE (OUTPATIENT)
Dept: CARDIAC SURGERY | Facility: CLINIC | Age: 50
End: 2025-03-20

## 2025-03-20 NOTE — TELEPHONE ENCOUNTER
Caller: Luis Milan    Relationship: Self    Best call back number: [phone]     What orders are you requesting (i.e. lab or imaging): CT CHEST WO CONTRAST    In what timeframe would the patient need to come in: [facility] F/U IS SCHEDULED 6.18    Where will you receive your lab/imaging services: [facility]    Additional notes: PT HAS AN ORDER FOR HIS CT SCAN FOR HIS F/U IN JUNE. HE NEEDS THE ORDER SENT TO THE LOCATION ABOVE DUE TO INSURANCE COVERAGES BEING CHEAPER FOR HIM AT THIS IMAGING CENTER. PLEASE CONTACT THE PT WHEN THE ORDER HAS BEEN SENT OVER SO HE CAN CALL TO SCHEDULE HIS SCAN

## 2025-03-21 NOTE — TELEPHONE ENCOUNTER
Order faxed to [facility] scheduling at [phone]. Called pt and advised. He will contact them to schedule.

## 2025-03-24 ENCOUNTER — TELEPHONE (OUTPATIENT)
Dept: CARDIAC SURGERY | Facility: CLINIC | Age: 50
End: 2025-03-24

## 2025-03-24 NOTE — TELEPHONE ENCOUNTER
Pt calling re: a CT to be sched at [facility].  They are reporting to pt that this will need precert from our office.  Pt calling to find out what he needs to do to get this done.  Can reach him at #[phone]/sandy

## 2025-03-24 NOTE — TELEPHONE ENCOUNTER
Pt called back re: this.  Attempted to explain that pt is not due for testing yet and that our office is working on this but that if ins auth is obtained to early, will  before testing is due.  Pt insists on getting this now as he states he cannot sched testing until auth is in place and he needs to have testing done ahead of his appt with our office.  Pt adamant re: speaking with someone re: getting this done ASAP.  Call tx'd to clinical staff line/sandy

## 2025-04-01 NOTE — TELEPHONE ENCOUNTER
CT Chest approved per [facility]. Auth scanned into media. Faxed order and auth to [facility]uling - called pt and notified of this so he can schedule. I did advise the auth expires 5/16/25 so he would need to schedule prior to that date. He voiced understanding.

## 2025-05-29 PROBLEM — R91.1 LUNG NODULE: Chronic | Status: ACTIVE | Noted: 2023-01-09

## 2025-05-29 PROBLEM — Z78.9 NONSMOKER: Status: ACTIVE | Noted: 2025-05-29

## 2025-05-29 NOTE — PROGRESS NOTES
Sheryl Sommer, HAYDE  [facility]  [address]   [address]            [place] [address]  Phone: [phone]  Fax: [phone]          Chief Complaint  Lung Nodule (Patient is here for follow up w/CT)    Subjective     Luis Milan presents to [facility] CARDIOTHORACIC SURGERY for appointment.    History of Present Illness  The patient presents for evaluation of lung nodules and asthma.    He has been under the care of Dr. Summers, with a history of blood tests conducted at Dr. Saucedo's office. He reports no personal or familial history of lung cancer and has never engaged in smoking. 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 He maintains an active lifestyle, running approximately 1.5 to 2 miles daily. He is not experiencing any shortness of breath or abnormal coughing, attributing any respiratory symptoms to seasonal changes.    He has a known diagnosis of asthma. His asthma is currently well-controlled, although he experienced a minor flare-up yesterday due to exposure to pine shavings and chicken houses without his mask. He had a significant flare-up in early spring but reports stable condition at present. He did not use his inhaler at night this week. He uses albuterol.    SOCIAL HISTORY  Occupations: Works in the poultry industry  Exercise: Runs a couple of miles a day  Tobacco: Has never smoked cigarettes    FAMILY HISTORY  - Negative for lung cancer in family history.    Objective   Past Medical History:   Diagnosis Date    Asthma in adult without complication 5/30/2025    Asthma, extrinsic Childhood    Lung nodule 01/09/2023   ,   Past Surgical History:   Procedure Laterality Date    VASECTOMY     ,   Family History   Problem Relation Age of Onset    Diabetes Mother     Hypertension Mother     Heart attack Mother     Asthma Father     Cancer Father    ,  "  Social History     Tobacco Use    Smoking status: Never     Passive exposure: Never    Smokeless tobacco: Never   Substance Use Topics    Alcohol use: Not Currently     Comment: Every once in a while. Nothing weekly    Drug use: Never   , (Not in a hospital admission)  , Allergies: Patient has no known allergies.    Vital Signs:   /92   Pulse 62   Ht 188 cm (74\")   Wt 90 kg (198 lb 6.4 oz)   SpO2 100%   BMI 25.47 kg/m²        Physical Exam  Vitals reviewed.   Constitutional:       General: He is not in acute distress.     Appearance: He is well-developed.   HENT:      Head: Normocephalic and atraumatic.   Eyes:      General: No scleral icterus.     Conjunctiva/sclera: Conjunctivae normal.      Pupils: Pupils are equal, round, and reactive to light.   Cardiovascular:      Rate and Rhythm: Normal rate.   Pulmonary:      Effort: Pulmonary effort is normal. No respiratory distress.      Breath sounds: Normal breath sounds. No wheezing or rales.   Musculoskeletal:         General: Normal range of motion.      Cervical back: Normal range of motion and neck supple.   Skin:     General: Skin is warm and dry.   Neurological:      Mental Status: He is alert and oriented to person, place, and time.   Psychiatric:         Behavior: Behavior normal.         Thought Content: Thought content normal.         Judgment: Judgment normal.          Result Review :  The following data was reviewed by: HAYDE Daniel on 05/30/2025:    RADIOLOGY - SCAN - CT CHEST, [facility], 5-12-25 (05/19/2025)         No results found for this or any previous visit.             Assessment and Plan  Diagnoses and all orders for this visit:    1. Lung nodules (Primary)  Overview:  CT Chest With Contrast Diagnostic (09/13/2022 16:33)  IMPRESSION:  1. Peripheral pleural-based nodule in the left upper lobe laterally  measuring 2.1 cm is nonspecific. A neoplasm is considered. Inflammation  and infection felt to be less likely. A Stewart hump " is also considered  but felt to be less likely as there is no CT evidence of pulmonary  embolus on the current study. This is not a CTA study.  2. There is also a 9 mm slightly ill-defined left upper lobe pulmonary  nodule worrisome for neoplasm. Infectious or inflammatory nodule also  considered.  3. Mild left hilar lymphadenopathy. Small mediastinal lymph nodes.  4. Small probable cyst in the right hepatic lobe of the liver. Probable  mild fatty liver.  5. Gastric wall thickening likely due to underdistention. Gastritis and  other etiologies are also considered.      NM PET/CT Skull Base to Mid Thigh (09/28/2022 10:17)   IMPRESSION:  1.  1.5 cm LEFT upper lobe pleural/subpleural nodule with hypermetabolic  activity. This is concerning for a neoplastic process although  differential diagnosis also includes an infectious or inflammatory  process. Hypermetabolic LEFT hilar and LEFT mediastinal lymph nodes are  presumably related to this process. Recommend close clinical follow-up.     2.  8 mm LEFT upper lobe pulmonary nodule with low-level FDG uptake  below physiologic background. This may be related to an infectious or  inflammatory process but an indolent hypometabolic neoplasm is also  within the differential. Recommend continued imaging surveillance.     3.  No evidence of metastatic disease in the neck, abdomen, or pelvis.    CT Chest With Contrast Diagnostic (12/21/2022 12:53)  IMPRESSION:  1. Both of the left upper lobe pulmonary nodules are smaller in size.  The larger 2.0 x 1.8 cm juxtapleural nodule now measures 1.0 x 0.5 cm.  The second 1.1 cm nodule has decreased to 0.7 cm. Favor  infectious/inflammatory process over lung neoplasia.  2. There are no new or enlarging nodules.  3. No suspicious adenopathy.    CT Chest With Contrast Diagnostic (06/21/2023 08:33)  IMPRESSION:  Continued decrease in size of 8 mm and 6 mm LEFT upper lobe pulmonary  nodules. Favor a slowly resolving infectious or inflammatory  process.  Recommend continued imaging surveillance.    CT Chest Without Contrast Diagnostic (12/20/2023 08:12)  IMPRESSION:  No change in 7 and 6 mm left upper lobe pulmonary nodules. Continued  imaging surveillance is recommended.    CT Chest Without Contrast Diagnostic (09/25/2024 07:53)  IMPRESSION:   No change in 7 mm and 6 mm left upper lobe pulmonary nodules. These have decreased in size when compared to older exams dating back to 9/13/2022. Favor etiology to be an infectious or inflammatory process.    RADIOLOGY - SCAN - CT CHEST, [facility], 5-12-25 (05/19/2025)       Assessment & Plan:  I personally reviewed CT of the chest completed at [facility] on 5/12/2025 and compared it to CT of the chest completed at [facility] from 9/25/2024.  Lung nodules appear to be stable.  Will ask [facility] urology to compare CT of the chest to most recent completed at [facility] for confirmation.    Continue monitoring.  The patient will have follow-up CT of the chest in 1 year.  At that time he will see Dr. Summers.     We discussed signs and symptoms suggesting malignancy including but not limited to hoarseness of voice, hemoptysis, persistent cough, unintended weight loss, chest pain, or persistent pneumonia.  The potential of a delayed diagnosis and adverse results were discussed.  Collaboratively a decision has been made that reflects the patient's risk factors, the data available and patient's considerations.  All questions have been answered to the best of my ability and patient is agreeable to the aforementioned plan.      Orders:  -     CT Chest Without Contrast; Future    2. Nonsmoker  Assessment & Plan:  Luis Milan  reports that he has never smoked. He has never been exposed to tobacco smoke. He has never used smokeless tobacco.        3. Asthma in adult without complication, unspecified asthma severity, unspecified whether persistent  Assessment & Plan:  Discussed with  patient that if he is requiring the use of his rescue inhaler more than 3 times a week or having nighttime awakenings requiring his rescue inhaler then he needs to follow back up with Dr. Saucedo's office for reevaluation of asthma.  Patient voiced understanding.             Follow Up  HAYDE Daniel  5/30/2025  11:54 CDT    Return in about 1 year (around 5/30/2026) for CT, with Dr. Summers.    Patient was given instructions and counseling regarding his condition or for health maintenance advice. Please see specific information pulled into the AVS if appropriate.     Please note that portions of this note were completed with a voice recognition program.    Patient or patient representative verbalized consent for the use of Ambient Listening during the visit with  HAYDE Daniel for chart documentation. 5/30/2025  11:20 CDT      Note to Patient:   The 21st Century Cures Act makes medical notes like this available to patients in the interest of transparency; however, please be advised this is a medical document.  It is intended as [id number] communication.  It is written in medical language and may contain abbreviations or verbiage that are unfamiliar.  It may appear blunt or direct.  Medical documents are intended to carry relevant information, facts as evident, and the clinical opinion of the practitioner.  This note may have been transcribed using a voice dictation system.  Voice-recognition errors may occur.  This should not be taken to alter the content or meaning of this note.

## 2025-05-30 ENCOUNTER — OFFICE VISIT (OUTPATIENT)
Dept: CARDIAC SURGERY | Facility: CLINIC | Age: 50
End: 2025-05-30
Payer: COMMERCIAL

## 2025-05-30 ENCOUNTER — TELEPHONE (OUTPATIENT)
Dept: CARDIAC SURGERY | Facility: CLINIC | Age: 50
End: 2025-05-30

## 2025-05-30 VITALS
SYSTOLIC BLOOD PRESSURE: 142 MMHG | HEIGHT: 74 IN | WEIGHT: 198.4 LBS | HEART RATE: 62 BPM | OXYGEN SATURATION: 100 % | DIASTOLIC BLOOD PRESSURE: 92 MMHG | BODY MASS INDEX: 25.46 KG/M2

## 2025-05-30 DIAGNOSIS — Z78.9 NONSMOKER: ICD-10-CM

## 2025-05-30 DIAGNOSIS — R91.8 LUNG NODULES: Primary | ICD-10-CM

## 2025-05-30 DIAGNOSIS — J45.909 ASTHMA IN ADULT WITHOUT COMPLICATION, UNSPECIFIED ASTHMA SEVERITY, UNSPECIFIED WHETHER PERSISTENT: ICD-10-CM

## 2025-05-30 PROCEDURE — 99214 OFFICE O/P EST MOD 30 MIN: CPT | Performed by: NURSE PRACTITIONER

## 2025-05-30 NOTE — TELEPHONE ENCOUNTER
Called [facility] - Radiology. I have to leave a message re: this pt.     Sheryl is needing their radiologist to compare the CT Chest done here on 9/25/24 to the one done at [facility] on 5/12/25. I have pushed the CT imaging to their facility.

## 2025-05-30 NOTE — ASSESSMENT & PLAN NOTE
I personally reviewed CT of the chest completed at [facility] on 5/12/2025 and compared it to CT of the chest completed at [facility] from 9/25/2024.  Lung nodules appear to be stable.  Will ask [facility] urology to compare CT of the chest to most recent completed at [facility] for confirmation.    Continue monitoring.  The patient will have follow-up CT of the chest in 1 year.  At that time he will see Dr. Summers.     We discussed signs and symptoms suggesting malignancy including but not limited to hoarseness of voice, hemoptysis, persistent cough, unintended weight loss, chest pain, or persistent pneumonia.  The potential of a delayed diagnosis and adverse results were discussed.  Collaboratively a decision has been made that reflects the patient's risk factors, the data available and patient's considerations.  All questions have been answered to the best of my ability and patient is agreeable to the aforementioned plan.

## 2025-05-30 NOTE — ASSESSMENT & PLAN NOTE
Luis Bjorn  reports that he has never smoked. He has never been exposed to tobacco smoke. He has never used smokeless tobacco.

## 2025-05-30 NOTE — ASSESSMENT & PLAN NOTE
Discussed with patient that if he is requiring the use of his rescue inhaler more than 3 times a week or having nighttime awakenings requiring his rescue inhaler then he needs to follow back up with Dr. Saucedo's office for reevaluation of asthma.  Patient voiced understanding.

## 2025-06-03 NOTE — TELEPHONE ENCOUNTER
Reviewed imaging with Dr. Summers.  Javier matta create an addendum and compared the imaging to his last scan at [facility].  We all agree that the nodules are stable.  He will keep his follow-up in 1 year with Dr. Summers.

## 2025-06-03 NOTE — TELEPHONE ENCOUNTER
Called [facility] and spoke with Isaac in Radiology. She did confirm they have the pushed imaging and will get the radiologist to compare. I provided our fax # for this to be sent to us.